# Patient Record
Sex: MALE | Race: WHITE | Employment: UNEMPLOYED | URBAN - METROPOLITAN AREA
[De-identification: names, ages, dates, MRNs, and addresses within clinical notes are randomized per-mention and may not be internally consistent; named-entity substitution may affect disease eponyms.]

---

## 2017-01-09 DIAGNOSIS — M75.121 COMPLETE TEAR OF RIGHT ROTATOR CUFF: Primary | ICD-10-CM

## 2017-03-14 ENCOUNTER — OFFICE VISIT (OUTPATIENT)
Dept: ORTHOPEDIC SURGERY | Age: 62
End: 2017-03-14

## 2017-03-14 DIAGNOSIS — M25.511 RIGHT SHOULDER PAIN, UNSPECIFIED CHRONICITY: Primary | ICD-10-CM

## 2017-03-14 DIAGNOSIS — Z96.611 STATUS POST REVERSE TOTAL REPLACEMENT OF RIGHT SHOULDER: ICD-10-CM

## 2017-03-14 PROCEDURE — 99024 POSTOP FOLLOW-UP VISIT: CPT | Performed by: PHYSICIAN ASSISTANT

## 2017-03-14 PROCEDURE — 73030 X-RAY EXAM OF SHOULDER: CPT | Performed by: PHYSICIAN ASSISTANT

## 2017-05-31 PROBLEM — R41.82 ALTERED MENTAL STATUS: Status: ACTIVE | Noted: 2017-05-31

## 2017-06-27 ENCOUNTER — OFFICE VISIT (OUTPATIENT)
Dept: PULMONOLOGY | Age: 62
End: 2017-06-27

## 2017-06-27 ENCOUNTER — TELEPHONE (OUTPATIENT)
Dept: PULMONOLOGY | Age: 62
End: 2017-06-27

## 2017-06-27 VITALS
WEIGHT: 315 LBS | HEIGHT: 75 IN | RESPIRATION RATE: 16 BRPM | OXYGEN SATURATION: 98 % | HEART RATE: 78 BPM | TEMPERATURE: 97.8 F | BODY MASS INDEX: 39.17 KG/M2 | SYSTOLIC BLOOD PRESSURE: 134 MMHG | DIASTOLIC BLOOD PRESSURE: 78 MMHG

## 2017-06-27 DIAGNOSIS — R06.83 SNORING: ICD-10-CM

## 2017-06-27 DIAGNOSIS — G47.33 OBSTRUCTIVE SLEEP APNEA: Primary | ICD-10-CM

## 2017-06-27 DIAGNOSIS — R06.81 WITNESSED APNEIC SPELLS: ICD-10-CM

## 2017-06-27 PROCEDURE — 99214 OFFICE O/P EST MOD 30 MIN: CPT | Performed by: INTERNAL MEDICINE

## 2017-06-27 ASSESSMENT — SLEEP AND FATIGUE QUESTIONNAIRES
HOW LIKELY ARE YOU TO NOD OFF OR FALL ASLEEP WHILE SITTING AND READING: 0
HOW LIKELY ARE YOU TO NOD OFF OR FALL ASLEEP WHILE SITTING AND TALKING TO SOMEONE: 0
HOW LIKELY ARE YOU TO NOD OFF OR FALL ASLEEP IN A CAR, WHILE STOPPED FOR A FEW MINUTES IN TRAFFIC: 0
NECK CIRCUMFERENCE (INCHES): 21
HOW LIKELY ARE YOU TO NOD OFF OR FALL ASLEEP WHILE WATCHING TV: 0
HOW LIKELY ARE YOU TO NOD OFF OR FALL ASLEEP WHEN YOU ARE A PASSENGER IN A CAR FOR AN HOUR WITHOUT A BREAK: 0
HOW LIKELY ARE YOU TO NOD OFF OR FALL ASLEEP WHILE SITTING INACTIVE IN A PUBLIC PLACE: 0
ESS TOTAL SCORE: 3
HOW LIKELY ARE YOU TO NOD OFF OR FALL ASLEEP WHILE SITTING QUIETLY AFTER LUNCH WITHOUT ALCOHOL: 0
HOW LIKELY ARE YOU TO NOD OFF OR FALL ASLEEP WHILE LYING DOWN TO REST IN THE AFTERNOON WHEN CIRCUMSTANCES PERMIT: 3

## 2017-07-12 PROBLEM — J96.02 ACUTE RESPIRATORY FAILURE WITH HYPOXIA AND HYPERCAPNIA (HCC): Status: ACTIVE | Noted: 2017-07-12

## 2017-07-12 PROBLEM — J96.01 ACUTE RESPIRATORY FAILURE WITH HYPOXIA AND HYPERCAPNIA (HCC): Status: ACTIVE | Noted: 2017-07-12

## 2017-09-11 ENCOUNTER — TELEPHONE (OUTPATIENT)
Dept: PULMONOLOGY | Age: 62
End: 2017-09-11

## 2017-11-28 ENCOUNTER — OFFICE VISIT (OUTPATIENT)
Dept: ORTHOPEDIC SURGERY | Age: 62
End: 2017-11-28

## 2017-11-28 DIAGNOSIS — Z96.611 STATUS POST REVERSE TOTAL REPLACEMENT OF RIGHT SHOULDER: Primary | ICD-10-CM

## 2017-11-28 PROCEDURE — G8427 DOCREV CUR MEDS BY ELIG CLIN: HCPCS | Performed by: ORTHOPAEDIC SURGERY

## 2017-11-28 PROCEDURE — 3017F COLORECTAL CA SCREEN DOC REV: CPT | Performed by: ORTHOPAEDIC SURGERY

## 2017-11-28 PROCEDURE — 1036F TOBACCO NON-USER: CPT | Performed by: ORTHOPAEDIC SURGERY

## 2017-11-28 PROCEDURE — G8417 CALC BMI ABV UP PARAM F/U: HCPCS | Performed by: ORTHOPAEDIC SURGERY

## 2017-11-28 PROCEDURE — 99213 OFFICE O/P EST LOW 20 MIN: CPT | Performed by: ORTHOPAEDIC SURGERY

## 2017-11-28 PROCEDURE — G8484 FLU IMMUNIZE NO ADMIN: HCPCS | Performed by: ORTHOPAEDIC SURGERY

## 2017-11-28 NOTE — PROGRESS NOTES
views of the shoulder and the shoulder outcome form will be completed.   Ricky Mazariegos  agreed with our plan

## 2018-01-31 PROBLEM — J10.1 INFLUENZA B: Status: ACTIVE | Noted: 2018-01-31

## 2018-02-23 PROBLEM — Z89.612 S/P AKA (ABOVE KNEE AMPUTATION) UNILATERAL, LEFT (HCC): Chronic | Status: ACTIVE | Noted: 2018-02-23

## 2018-02-23 PROBLEM — J96.11 CHRONIC HYPOXEMIC RESPIRATORY FAILURE (HCC): Chronic | Status: ACTIVE | Noted: 2018-02-23

## 2018-02-23 PROBLEM — J96.01 ACUTE RESPIRATORY FAILURE WITH HYPOXIA AND HYPERCAPNIA (HCC): Status: RESOLVED | Noted: 2017-07-12 | Resolved: 2018-02-23

## 2018-02-23 PROBLEM — Z96.611 STATUS POST REVERSE TOTAL REPLACEMENT OF RIGHT SHOULDER: Status: RESOLVED | Noted: 2017-03-14 | Resolved: 2018-02-23

## 2018-02-23 PROBLEM — R41.82 ALTERED MENTAL STATUS: Status: RESOLVED | Noted: 2017-05-31 | Resolved: 2018-02-23

## 2018-02-23 PROBLEM — J96.02 ACUTE RESPIRATORY FAILURE WITH HYPOXIA AND HYPERCAPNIA (HCC): Status: RESOLVED | Noted: 2017-07-12 | Resolved: 2018-02-23

## 2018-04-02 ENCOUNTER — TELEPHONE (OUTPATIENT)
Dept: ORTHOPEDIC SURGERY | Age: 63
End: 2018-04-02

## 2021-08-23 NOTE — PATIENT INSTRUCTIONS
Abdominal Pain in Children: Care Instructions  Your Care Instructions     Abdominal pain has many possible causes. Some are not serious and get better on their own in a few days. Others need more testing and treatment. If your child's belly pain continues or gets worse, he or she may need more tests to find out what is wrong. Most cases of abdominal pain in children are caused by minor problems, such as stomach flu or constipation. Home treatment often is all that is needed to relieve them. Your doctor may have recommended a follow-up visit in the next 8 to 12 hours. Do not ignore new symptoms, such as fever, nausea and vomiting, urination problems, or pain that gets worse. These may be signs of a more serious problem. The doctor has checked your child carefully, but problems can develop later. If you notice any problems or new symptoms, get medical treatment right away. Follow-up care is a key part of your child's treatment and safety. Be sure to make and go to all appointments, and call your doctor if your child is having problems. It's also a good idea to know your child's test results and keep a list of the medicines your child takes. How can you care for your child at home? · Make sure your child rests. · Give your child lots of fluids. This is very important if your child is vomiting or has diarrhea. Give your child sips of water or drinks such as Pedialyte or Infalyte. These drinks contain a mix of salt, sugar, and minerals. You can buy them at drugstores or grocery stores. Give these drinks as long as your child is throwing up or has diarrhea. Do not use them as the only source of liquids or food for more than 12 to 24 hours. · Feed your child mild foods, such as rice, dry toast or crackers, bananas, and applesauce. Try feeding your child several small meals instead of 2 or 3 large ones.   · Do not give your child spicy foods, fruits other than bananas or applesauce, or drinks that contain I have reviewed the provider's instructions with the patient, answering all questions to his satisfaction. caffeine until 48 hours after all your child's symptoms have gone away. · Do not feed your child foods that are high in fat. · Have your child take medicines exactly as directed. Call your doctor if you think your child is having a problem with a medicine. · Do not give your child aspirin, ibuprofen (Advil, Motrin), or naproxen (Aleve). These can cause stomach upset. When should you call for help? Call 911 anytime you think your child may need emergency care. For example, call if:    · Your child passes out (loses consciousness).     · Your child vomits blood or what looks like coffee grounds.     · Your child's stools are maroon or very bloody. Call your doctor now or seek immediate medical care if:    · Your child has new belly pain or his or her pain gets worse.     · Your child's pain becomes focused in one area of his or her belly.     · Your child has a new or higher fever.     · Your child's stools are black and look like tar or have streaks of blood.     · Your child has new or worse diarrhea or vomiting.     · Your child has symptoms of a urinary tract infection. These may include:  ? Pain when he or she urinates. ? Urinating more often than usual.  ? Blood in his or her urine. Watch closely for changes in your child's health, and be sure to contact your doctor if:    · Your child does not get better as expected. Where can you learn more? Go to http://www.[employer].com/  Enter Q757 in the search box to learn more about \"Abdominal Pain in Children: Care Instructions. \"  Current as of: February 26, 2020               Content Version: 12.8  © 0025-7090 [employer]. Care instructions adapted under license by [employer] (which disclaims liability or warranty for this information).  If you have questions about a medical condition or this instruction, always ask your healthcare professional. [place] [address] any warranty or liability for your use of this information.     Avoid fried/greasy foods